# Patient Record
Sex: FEMALE
[De-identification: names, ages, dates, MRNs, and addresses within clinical notes are randomized per-mention and may not be internally consistent; named-entity substitution may affect disease eponyms.]

---

## 2018-01-02 LAB
BASOPHILS # BLD AUTO: 0 10*3/UL (ref 0–0.1)
BASOPHILS NFR BLD AUTO: 0.4 % (ref 0–1)
DEPRECATED NEUTROPHILS # BLD AUTO: 4.5 10*3/UL (ref 2.1–6.9)
EOSINOPHIL # BLD AUTO: 0 10*3/UL (ref 0–0.4)
EOSINOPHIL NFR BLD AUTO: 0 % (ref 0–6)
ERYTHROCYTE [DISTWIDTH] IN CORD BLOOD: 11.6 % (ref 11.7–14.4)
HCT VFR BLD AUTO: 36.7 % (ref 34.2–44.1)
HGB BLD-MCNC: 12.6 G/DL (ref 12–16)
LYMPHOCYTES # BLD: 2.6 10*3/UL (ref 1–3.2)
LYMPHOCYTES NFR BLD AUTO: 35 % (ref 18–39.1)
MCH RBC QN AUTO: 31.3 PG (ref 28–32)
MCHC RBC AUTO-ENTMCNC: 34.3 G/DL (ref 31–35)
MCV RBC AUTO: 91.3 FL (ref 81–99)
MONOCYTES # BLD AUTO: 0.3 10*3/UL (ref 0.2–0.8)
MONOCYTES NFR BLD AUTO: 4.4 % (ref 4.4–11.3)
NEUTS SEG NFR BLD AUTO: 60.1 % (ref 38.7–80)
PLATELET # BLD AUTO: 241 X10E3/UL (ref 140–360)
RBC # BLD AUTO: 4.02 X10E6/UL (ref 3.6–5.1)

## 2018-01-05 ENCOUNTER — HOSPITAL ENCOUNTER (OUTPATIENT)
Dept: HOSPITAL 88 - OR | Age: 33
Discharge: HOME | End: 2018-01-05
Attending: UROLOGY
Payer: COMMERCIAL

## 2018-01-05 DIAGNOSIS — N30.20: Primary | ICD-10-CM

## 2018-01-05 DIAGNOSIS — Z01.812: ICD-10-CM

## 2018-01-05 DIAGNOSIS — N32.89: ICD-10-CM

## 2018-01-05 PROCEDURE — 81025 URINE PREGNANCY TEST: CPT

## 2018-01-05 PROCEDURE — 36415 COLL VENOUS BLD VENIPUNCTURE: CPT

## 2018-01-05 PROCEDURE — 52214 CYSTOSCOPY AND TREATMENT: CPT

## 2018-01-05 PROCEDURE — 88305 TISSUE EXAM BY PATHOLOGIST: CPT

## 2018-01-05 PROCEDURE — 85025 COMPLETE CBC W/AUTO DIFF WBC: CPT

## 2018-01-19 NOTE — OPERATIVE REPORT
DATE OF PROCEDURE:  January 05, 2018 

 

PREOPERATIVE DIAGNOSIS:  Chronic cystitis.



POSTOPERATIVE DIAGNOSIS:  Chronic cystitis.



OPERATIVE PROCEDURES PERFORMED 

1.  Cystoscopy.

2.  Bladder biopsy.

3.  Hydrodistention of the bladder.



ANESTHESIA:  General anesthesia.



ESTIMATED BLOOD LOSS:  Minimal.



INDICATIONS:  Ms. Radha Nation is a 32-year-old woman with a history of 

chronic pelvic pain that her gynecologist has decided is not of gynecologic 

origin.  She has recurrent frequency, urgency and suprapubic pain.  She now 

presents for potential diagnosis and management of this problem.



PROCEDURE IN DETAIL:  Patient was brought into the operating room and 

placed in the supine position.  After administration of general anesthesia, 

was placed in the dorsal lithotomy position, and prepped and draped in the 

usual sterile fashion.  Cystourethroscopy was performed using a 21-Romanian 

cystoscope.  The anterior and posterior urethra were noted to be normal.  

The bladder was entered without difficulty.  Upon entrance into the 

bladder, the ureteral orifices were in normal anatomical position and 

produced clear efflux.  There were no mucosal lesions identified.  There 

was filling of the bladder some mild trabeculations noted, grade 1, but no 

cellules or diverticula appreciated.  There was no noted bleeding or 

glomerulations seen with the bladder filling.  Her bladder capacity was 

greater than 800 mL.  The bladder was then decompressed, and biopsies were 

taken from the posterior wall and right and left lateral walls.  These were 

done with the cold-cut bladder biopsy forceps.  The biopsy sites were then 

fulgurated using the Bugbee electrode.  The bladder was then filled to 

approximately 1 L in capacity, and held in that position for approximately 

90 seconds.  The bladder was then decompressed and the cystoscope and 

sheath were removed.  The patient returned to the supine position, and the 

anesthesia was reversed.  She was transferred to her bed and taken to the 

postanesthesia care unit in good condition.  Of note, the needle and 

instrument counts were correct at the conclusion of the case.  









DD:  01/19/2018 09:00

DT:  01/19/2018 10:25

Job#:  [id number]

## 2019-01-15 ENCOUNTER — HOSPITAL ENCOUNTER (OUTPATIENT)
Dept: HOSPITAL 88 - PT | Age: 34
LOS: 16 days | End: 2019-01-31
Attending: NEUROLOGICAL SURGERY
Payer: COMMERCIAL

## 2019-01-15 DIAGNOSIS — M51.17: Primary | ICD-10-CM
